# Patient Record
Sex: MALE | Race: WHITE | URBAN - METROPOLITAN AREA
[De-identification: names, ages, dates, MRNs, and addresses within clinical notes are randomized per-mention and may not be internally consistent; named-entity substitution may affect disease eponyms.]

---

## 2021-07-22 ENCOUNTER — HISTORICAL (OUTPATIENT)
Dept: ADMINISTRATIVE | Facility: HOSPITAL | Age: 61
End: 2021-07-22

## 2021-07-22 LAB
ALBUMIN SERPL-MCNC: 4 GM/DL (ref 3.4–4.8)
ALBUMIN/GLOB SERPL: 1 RATIO (ref 1.1–2)
ALP SERPL-CCNC: 77 UNIT/L (ref 40–150)
ALT SERPL-CCNC: 16 UNIT/L (ref 0–55)
AST SERPL-CCNC: 20 UNIT/L (ref 5–34)
BILIRUB SERPL-MCNC: 0.5 MG/DL
BILIRUBIN DIRECT+TOT PNL SERPL-MCNC: 0.2 MG/DL (ref 0–0.5)
BILIRUBIN DIRECT+TOT PNL SERPL-MCNC: 0.3 MG/DL (ref 0–0.8)
BUN SERPL-MCNC: 14.2 MG/DL (ref 8.4–25.7)
CALCIUM SERPL-MCNC: 9.3 MG/DL (ref 8.8–10)
CHLORIDE SERPL-SCNC: 106 MMOL/L (ref 98–107)
CO2 SERPL-SCNC: 25 MMOL/L (ref 23–31)
CREAT SERPL-MCNC: 1.02 MG/DL (ref 0.73–1.18)
GLOBULIN SER-MCNC: 3.9 GM/DL (ref 2.4–3.5)
GLUCOSE SERPL-MCNC: 72 MG/DL (ref 82–115)
POTASSIUM SERPL-SCNC: 4.4 MMOL/L (ref 3.5–5.1)
PROT SERPL-MCNC: 7.9 GM/DL (ref 5.8–7.6)
SODIUM SERPL-SCNC: 138 MMOL/L (ref 136–145)

## 2021-07-26 ENCOUNTER — HISTORICAL (OUTPATIENT)
Dept: SURGERY | Facility: HOSPITAL | Age: 61
End: 2021-07-26

## 2021-08-06 ENCOUNTER — HISTORICAL (OUTPATIENT)
Dept: SURGERY | Facility: HOSPITAL | Age: 61
End: 2021-08-06

## 2021-08-06 LAB — SARS-COV-2 AG RESP QL IA.RAPID: NEGATIVE

## 2022-04-10 ENCOUNTER — HISTORICAL (OUTPATIENT)
Dept: ADMINISTRATIVE | Facility: HOSPITAL | Age: 62
End: 2022-04-10

## 2022-04-25 VITALS
BODY MASS INDEX: 20.73 KG/M2 | DIASTOLIC BLOOD PRESSURE: 85 MMHG | SYSTOLIC BLOOD PRESSURE: 135 MMHG | WEIGHT: 129 LBS | HEIGHT: 66 IN | OXYGEN SATURATION: 97 %

## 2022-05-03 NOTE — HISTORICAL [facility]ER
This is a historical note converted from [employer]. Formatting and pictures may have been removed.  Please reference [employer] for original formatting and attached multimedia. Indication for Surgery  ?  Date: 7/26/21  ?  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  Postoperative Diagnosis: Same  ?  Operation performed: Excision of?scalp BCCa  ?  Attending Surgeon:?Roland Anthony MD  ?  Resident Surgeon:?Nini Perez MD; MD Davis;?Jason Tellez MD  ?  Anesthesia: General  ?  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 Will need FFL at clinic appt.  ?  Specimen:?scalp lesion, left anterior margin, left posterior margin,?right anterior margin, right posterior?margin  ?  Indications:?67yo M with?BCCa of scalp, does not appear to have?bony involvment on CT head, no?LAD  ?  Operative Report: The patient was placed into a supine position on the operating table  for patient comfort. The scalp was examined. 5mm margins were marked circumferentially around  the lesion into normal appearing skin with the resulting defect of the right lateral cheek  measuring 4.5x5cm. The entire area was then infiltrated with 3cc of 1%  lidocaine with 1:100,000 epinephrine. The?scalp was then prepped and draped in  standard fashion.  The #15 blade was used to make an incision in the skin along the marked lines. The  depth of the excision was through the subcutaneous tissue down to the level of the  subcutaneous fat. The lesion was removed in its entirety from superior to inferior.  Careful attention was made for a homogenous tissue removal to the level of the  subcutaneous fat across the entire specimen. Prior to removal, a short stitch was  placed at the superior margin and a long stitch was placed?in?right lateral lesion.?The  specimen was then placed in formalin. The wound bed was then inspected and  the  bipolar cautery was used for hemostasis. A xerofrom bolster was placed and sutured with 3-0 nylon.  The bolster was then dressed in bacitracin.  The patient was delivered to the recovery room in good, stable condition. The patient  tolerated the procedure well and was comfortable throughout. Patient was given all postoperative instructions and wound care was reviewed. She was instructed to use  bacitracin over the wound and counseled regarding sun avoidance. Post op medication  including pain control was given. A follow-up appointment was scheduled for staged  reconstruction. The patient was discharged home in stable condition. ?